# Patient Record
Sex: MALE | Race: WHITE | NOT HISPANIC OR LATINO | Employment: FULL TIME | URBAN - METROPOLITAN AREA
[De-identification: names, ages, dates, MRNs, and addresses within clinical notes are randomized per-mention and may not be internally consistent; named-entity substitution may affect disease eponyms.]

---

## 2021-08-27 ENCOUNTER — APPOINTMENT (OUTPATIENT)
Dept: CT IMAGING | Facility: HOSPITAL | Age: 71
End: 2021-08-27

## 2021-08-27 ENCOUNTER — HOSPITAL ENCOUNTER (EMERGENCY)
Facility: HOSPITAL | Age: 71
Discharge: ANOTHER HOSPITAL | End: 2021-08-27
Attending: EMERGENCY MEDICINE | Admitting: EMERGENCY MEDICINE

## 2021-08-27 VITALS
DIASTOLIC BLOOD PRESSURE: 67 MMHG | HEIGHT: 66 IN | WEIGHT: 200 LBS | SYSTOLIC BLOOD PRESSURE: 148 MMHG | BODY MASS INDEX: 32.14 KG/M2 | RESPIRATION RATE: 16 BRPM | HEART RATE: 61 BPM | TEMPERATURE: 98.9 F | OXYGEN SATURATION: 96 %

## 2021-08-27 DIAGNOSIS — Z98.890 HISTORY OF SURGICAL REMOVAL OF TESTICLE: ICD-10-CM

## 2021-08-27 DIAGNOSIS — Z86.39 HISTORY OF HYPERLIPIDEMIA: ICD-10-CM

## 2021-08-27 DIAGNOSIS — L02.214 ABSCESS OF LEFT GROIN: Primary | ICD-10-CM

## 2021-08-27 DIAGNOSIS — N28.9 ACUTE RENAL INSUFFICIENCY: ICD-10-CM

## 2021-08-27 DIAGNOSIS — C49.9 LIPOSARCOMA (HCC): ICD-10-CM

## 2021-08-27 DIAGNOSIS — Z87.438 HISTORY OF BPH: ICD-10-CM

## 2021-08-27 DIAGNOSIS — Z90.79 HISTORY OF SURGICAL REMOVAL OF TESTICLE: ICD-10-CM

## 2021-08-27 DIAGNOSIS — Z86.79 HISTORY OF HYPERTENSION: ICD-10-CM

## 2021-08-27 LAB
ALBUMIN SERPL-MCNC: 3.8 G/DL (ref 3.5–5.2)
ALBUMIN/GLOB SERPL: 1.2 G/DL
ALP SERPL-CCNC: 56 U/L (ref 39–117)
ALT SERPL W P-5'-P-CCNC: 6 U/L (ref 1–41)
ANION GAP SERPL CALCULATED.3IONS-SCNC: 14 MMOL/L (ref 5–15)
AST SERPL-CCNC: 15 U/L (ref 1–40)
BASOPHILS # BLD AUTO: 0.06 10*3/MM3 (ref 0–0.2)
BASOPHILS NFR BLD AUTO: 0.5 % (ref 0–1.5)
BILIRUB SERPL-MCNC: 0.4 MG/DL (ref 0–1.2)
BUN SERPL-MCNC: 22 MG/DL (ref 8–23)
BUN/CREAT SERPL: 17.2 (ref 7–25)
CALCIUM SPEC-SCNC: 9.6 MG/DL (ref 8.6–10.5)
CHLORIDE SERPL-SCNC: 102 MMOL/L (ref 98–107)
CO2 SERPL-SCNC: 22 MMOL/L (ref 22–29)
CREAT SERPL-MCNC: 1.28 MG/DL (ref 0.76–1.27)
D-LACTATE SERPL-SCNC: 1 MMOL/L (ref 0.5–2)
DEPRECATED RDW RBC AUTO: 48.8 FL (ref 37–54)
EOSINOPHIL # BLD AUTO: 0.16 10*3/MM3 (ref 0–0.4)
EOSINOPHIL NFR BLD AUTO: 1.3 % (ref 0.3–6.2)
ERYTHROCYTE [DISTWIDTH] IN BLOOD BY AUTOMATED COUNT: 14.4 % (ref 12.3–15.4)
GFR SERPL CREATININE-BSD FRML MDRD: 56 ML/MIN/1.73
GLOBULIN UR ELPH-MCNC: 3.2 GM/DL
GLUCOSE SERPL-MCNC: 129 MG/DL (ref 65–99)
HCT VFR BLD AUTO: 38.1 % (ref 37.5–51)
HGB BLD-MCNC: 12.1 G/DL (ref 13–17.7)
HOLD SPECIMEN: NORMAL
IMM GRANULOCYTES # BLD AUTO: 0.12 10*3/MM3 (ref 0–0.05)
IMM GRANULOCYTES NFR BLD AUTO: 1 % (ref 0–0.5)
LYMPHOCYTES # BLD AUTO: 0.95 10*3/MM3 (ref 0.7–3.1)
LYMPHOCYTES NFR BLD AUTO: 7.8 % (ref 19.6–45.3)
MCH RBC QN AUTO: 28.9 PG (ref 26.6–33)
MCHC RBC AUTO-ENTMCNC: 31.8 G/DL (ref 31.5–35.7)
MCV RBC AUTO: 91.1 FL (ref 79–97)
MONOCYTES # BLD AUTO: 1 10*3/MM3 (ref 0.1–0.9)
MONOCYTES NFR BLD AUTO: 8.2 % (ref 5–12)
NEUTROPHILS NFR BLD AUTO: 81.2 % (ref 42.7–76)
NEUTROPHILS NFR BLD AUTO: 9.84 10*3/MM3 (ref 1.7–7)
NRBC BLD AUTO-RTO: 0 /100 WBC (ref 0–0.2)
PLATELET # BLD AUTO: 264 10*3/MM3 (ref 140–450)
PMV BLD AUTO: 11.4 FL (ref 6–12)
POTASSIUM SERPL-SCNC: 4.2 MMOL/L (ref 3.5–5.2)
PROT SERPL-MCNC: 7 G/DL (ref 6–8.5)
RBC # BLD AUTO: 4.18 10*6/MM3 (ref 4.14–5.8)
SODIUM SERPL-SCNC: 138 MMOL/L (ref 136–145)
WBC # BLD AUTO: 12.13 10*3/MM3 (ref 3.4–10.8)
WHOLE BLOOD HOLD SPECIMEN: NORMAL
WHOLE BLOOD HOLD SPECIMEN: NORMAL

## 2021-08-27 PROCEDURE — 25010000002 IOPAMIDOL 61 % SOLUTION: Performed by: EMERGENCY MEDICINE

## 2021-08-27 PROCEDURE — 80053 COMPREHEN METABOLIC PANEL: CPT | Performed by: PHYSICIAN ASSISTANT

## 2021-08-27 PROCEDURE — 83605 ASSAY OF LACTIC ACID: CPT | Performed by: PHYSICIAN ASSISTANT

## 2021-08-27 PROCEDURE — 74177 CT ABD & PELVIS W/CONTRAST: CPT

## 2021-08-27 PROCEDURE — 25010000002 VANCOMYCIN 10 G RECONSTITUTED SOLUTION: Performed by: PHYSICIAN ASSISTANT

## 2021-08-27 PROCEDURE — 87147 CULTURE TYPE IMMUNOLOGIC: CPT | Performed by: PHYSICIAN ASSISTANT

## 2021-08-27 PROCEDURE — 99283 EMERGENCY DEPT VISIT LOW MDM: CPT

## 2021-08-27 PROCEDURE — 87070 CULTURE OTHR SPECIMN AEROBIC: CPT | Performed by: PHYSICIAN ASSISTANT

## 2021-08-27 PROCEDURE — 87186 SC STD MICRODIL/AGAR DIL: CPT | Performed by: PHYSICIAN ASSISTANT

## 2021-08-27 PROCEDURE — 87205 SMEAR GRAM STAIN: CPT | Performed by: PHYSICIAN ASSISTANT

## 2021-08-27 PROCEDURE — 87040 BLOOD CULTURE FOR BACTERIA: CPT | Performed by: PHYSICIAN ASSISTANT

## 2021-08-27 PROCEDURE — 85025 COMPLETE CBC W/AUTO DIFF WBC: CPT

## 2021-08-27 PROCEDURE — 96365 THER/PROPH/DIAG IV INF INIT: CPT

## 2021-08-27 RX ORDER — NEBIVOLOL 20 MG/1
20 TABLET ORAL DAILY
COMMUNITY

## 2021-08-27 RX ORDER — FENOFIBRIC ACID 135 MG/1
135 CAPSULE, DELAYED RELEASE ORAL DAILY
COMMUNITY

## 2021-08-27 RX ORDER — ASPIRIN 81 MG/1
81 TABLET, CHEWABLE ORAL DAILY
COMMUNITY

## 2021-08-27 RX ORDER — FUROSEMIDE 20 MG/1
20 TABLET ORAL DAILY
COMMUNITY

## 2021-08-27 RX ORDER — LANSOPRAZOLE 30 MG/1
30 CAPSULE, DELAYED RELEASE ORAL DAILY
COMMUNITY

## 2021-08-27 RX ORDER — SODIUM CHLORIDE 0.9 % (FLUSH) 0.9 %
10 SYRINGE (ML) INJECTION AS NEEDED
Status: DISCONTINUED | OUTPATIENT
Start: 2021-08-27 | End: 2021-08-28 | Stop reason: HOSPADM

## 2021-08-27 RX ORDER — TAMSULOSIN HYDROCHLORIDE 0.4 MG/1
1 CAPSULE ORAL DAILY
COMMUNITY

## 2021-08-27 RX ORDER — NISOLDIPINE 34 MG/1
34 TABLET, FILM COATED, EXTENDED RELEASE ORAL DAILY
COMMUNITY

## 2021-08-27 RX ORDER — ICOSAPENT ETHYL 1000 MG/1
2 CAPSULE ORAL 2 TIMES DAILY WITH MEALS
COMMUNITY

## 2021-08-27 RX ORDER — FINASTERIDE 5 MG/1
5 TABLET, FILM COATED ORAL DAILY
COMMUNITY

## 2021-08-27 RX ORDER — METHOCARBAMOL 750 MG/1
750 TABLET, FILM COATED ORAL 3 TIMES DAILY
COMMUNITY

## 2021-08-27 RX ADMIN — VANCOMYCIN HYDROCHLORIDE 1750 MG: 100 INJECTION, POWDER, LYOPHILIZED, FOR SOLUTION INTRAVENOUS at 23:21

## 2021-08-27 RX ADMIN — IOPAMIDOL 100 ML: 612 INJECTION, SOLUTION INTRAVENOUS at 20:15

## 2021-08-27 NOTE — ED PROVIDER NOTES
Subjective   This is a 71-year-old male that presents to the ER with probable abscess to the left groin at incision site from recent left spermatic cord removal secondary to liposarcoma of the left groin.  Patient is followed at  and sees urology, Dr. Miguel Angel Villanueva.  Patient says that he initially had left testicle removed due to cancer and most recently had left spermatic cord removed on 8/2/2021.  Patient has done fairly well following surgery.  Proximately 3 days ago he noticed some redness, induration, and drainage coming from the incision site.  He was utilizing warm compresses.  He denies any systemic symptoms of fever, chills, generalized body aches, or nausea/vomiting.  He denies personal history of diabetes.  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  Patient also has history of hypertension, hyperlipidemia, GERD, and history of basal cell carcinoma.  Patient says that he was able to express a large amount of purulent drainage from the incision site earlier this afternoon.  He tried to be evaluated by Dr. Villanueva and he cannot get into his office.  He also initially try to get a , which is where all of his surgeries were performed, but they said that his wife had to stay out in the car, and she has problems with her retina, and patient became upset and decided to come to our emergency room instead.      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 Pt had left testicle removed, as well as left spermatic cord removed recently on 8/2/21 at .  Awaiting  radiation mapping on 9/9/21 and will start radiation/chemo soon.    Relieved by:  Nothing  Worsened by:  Nothing  Ineffective treatments:  Warm compresses and draining/squeezing  Associated symptoms: no anorexia, no fatigue, no fever, no nausea and no vomiting    Risk factors: no hx of MRSA        Review of Systems   Constitutional: Negative.  Negative for activity change, appetite change, chills, diaphoresis, fatigue and fever.   Respiratory: Negative.  Negative for cough and shortness of breath.    Cardiovascular: Negative.  Negative for chest pain, palpitations and leg swelling.   Gastrointestinal: Negative.  Negative for abdominal pain, anorexia, constipation, diarrhea, nausea and vomiting.   Genitourinary: Negative for flank pain and frequency.        History of liposarcoma to left groin with radiation to left testicle.  S/P left testicle and spermatic cord removal.  Recent development of abscess.   Musculoskeletal: Negative.  Negative for back pain.   Skin: Positive for color change (erythema to suprapubic region.) and wound (abscess near recent incision from left testicle removal in 6/2021.).        Induration to suprapubic region.     Neurological: Negative.    All other systems reviewed and are negative.      Past Medical History:   Diagnosis Date   • Basal cell carcinoma    • Chronic sinusitis    • GERD (gastroesophageal reflux disease)    • Hyperlipidemia    • Hypertension        Allergies   Allergen Reactions   • Latex Rash       Past Surgical History:   Procedure Laterality Date   • APPENDECTOMY     • HERNIA REPAIR     • JOINT REPLACEMENT     • LYMPH NODE DISSECTION     • SINUS SURGERY     • TESTICLE SURGERY         History reviewed. No pertinent family history.    Social History     Socioeconomic History   • Marital status:      Spouse name: Not on file   • Number of children: Not on file   • Years of education: Not on file   • Highest education level: Not on file   Tobacco Use   • Smoking  status: Never Smoker   Substance and Sexual Activity   • Alcohol use: Yes     Comment: occasionally   • Drug use: Never   • Sexual activity: Defer           Objective   Physical Exam  Vitals and nursing note reviewed.   Constitutional:       Appearance: Normal appearance.   HENT:      Head: Normocephalic and atraumatic.      Right Ear: Tympanic membrane normal.      Left Ear: Tympanic membrane normal.      Nose: Nose normal.      Mouth/Throat:      Mouth: Mucous membranes are moist.      Pharynx: Oropharynx is clear.   Eyes:      Extraocular Movements: Extraocular movements intact.      Conjunctiva/sclera: Conjunctivae normal.      Pupils: Pupils are equal, round, and reactive to light.   Cardiovascular:      Rate and Rhythm: Normal rate and regular rhythm.  No extrasystoles are present.     Pulses: Normal pulses.      Heart sounds: Normal heart sounds.      Comments: Regular rate and rhythm.  No tachycardia.  No pedal edema to lower extremities.  Pulmonary:      Effort: Pulmonary effort is normal. No tachypnea or accessory muscle usage.      Breath sounds: Normal breath sounds. No decreased air movement.      Comments: Lungs are clear to auscultation bilaterally.  Abdominal:      General: Bowel sounds are normal. There is distension.      Palpations: Abdomen is soft.      Tenderness: There is no abdominal tenderness. There is no right CVA tenderness, left CVA tenderness, guarding or rebound.      Comments: Mild distention.  Soft with active bowel sounds.  Nontender to palpation.   Genitourinary:         Comments: There is incision to the left inguinal region status post previous left testicle removal in 6/2021.  There is copious purulent drainage expelled from the incision site.  Aerobic wound cultures were obtained.  There is confluent erythema and induration that extends to the mid suprapubic region.  Status post left testicle removal.  Musculoskeletal:         General: Normal range of motion.      Cervical back:  Normal range of motion and neck supple.      Right lower leg: No edema.      Left lower leg: No edema.   Skin:     General: Skin is warm and dry.      Findings: Erythema present.      Comments: See  exam for details.   Neurological:      General: No focal deficit present.      Mental Status: He is alert.         Procedures           ED Course  ED Course as of Aug 27 2229   Fri Aug 27, 2021   2105 CBC reveals mild leukocytosis with white blood cell count of 12.13.  Differential shows 81% neutrophils.  Chemistries reveal creatinine of 1.28, but otherwise chemistries were normal.  No previous chemistries for comparison.  Aerobic wound culture was obtained from drainage from the left groin, incision site.  CT of the abdomen/pelvis with contrast reveals postsurgical changes in the left inguinal region from recent reported spermatic cord removal.  There is inflammatory stranding within the soft tissues and edema extending into the left hemiscrotum.  There is an ill-defined slightly rim-enhancing collection in the proximal portion of the left hemiscrotum in the region of the inguinal canal measuring 3.3 x 2.3 cm.  This may represent an expected postsurgical fluid collection, but abscess is not excluded.  There is also an ill-defined fluid density collection within the pelvis just anterior to the left iliac vessels and left iliopsoas muscle, measuring 3 x 1.6 cm.  This may also represent an expected postsurgical fluid collection but abscess is not excluded.  No other abdominal or pelvic findings.  Prostate gland enlargement.    [FC]   2109 Paged [facility] for consult since patient has had all of his recent surgeries at their facility and is followed by Dr. Miguel Angel Villanueva, Urology.      [FC]   2131 Discussed the case with Dr. Villanueva's associate.  They said that the incision that is draining is the incision from June, 2021 where patient had left testicle removed.  Patient had recent laparoscopic incisions for the left spermatic cord  removal on 8/2/2021.  They recommended to initiate vancomycin and transfer him back to  where he had all of his previous surgeries performed.  Dr. Metzger, transfer physician, expected patient.  I will go update the patient and prepare him for transfer.    [FC]   2223 I have gone into the patient's room multiple times because his wife cannot see at night due to central visual loss.  Patient's wife is very aggravated and upset and arguing with the patient abou how he will get to  and how she will get to .  They have finally contacted their daughter who is coming to get his wife.  Advised that admission is necessary for IV antibiotics and for patient's urologist to take a look at the abscess in his groin with recent multiple surgeries and history of liposarcoma.  Both wife and patient have finally come down and are agreeable for transfer.    [FC]      ED Course User Index  [FC] Yesica Leon PA-C           Recent Results (from the past 24 hour(s))   Green Top (Gel)    Collection Time: 08/27/21  4:06 PM   Result Value Ref Range    Extra Tube Hold for add-ons.    Lavender Top    Collection Time: 08/27/21  4:06 PM   Result Value Ref Range    Extra Tube hold for add-on    Gold Top - SST    Collection Time: 08/27/21  4:06 PM   Result Value Ref Range    Extra Tube Hold for add-ons.    Gray Top    Collection Time: 08/27/21  4:06 PM   Result Value Ref Range    Extra Tube Hold for add-ons.    Light Blue Top    Collection Time: 08/27/21  4:06 PM   Result Value Ref Range    Extra Tube hold for add-on    CBC Auto Differential    Collection Time: 08/27/21  4:06 PM    Specimen: Blood   Result Value Ref Range    WBC 12.13 (H) 3.40 - 10.80 10*3/mm3    RBC 4.18 4.14 - 5.80 10*6/mm3    Hemoglobin 12.1 (L) 13.0 - 17.7 g/dL    Hematocrit 38.1 37.5 - 51.0 %    MCV 91.1 79.0 - 97.0 fL    MCH 28.9 26.6 - 33.0 pg    MCHC 31.8 31.5 - 35.7 g/dL    RDW 14.4 12.3 - 15.4 %    RDW-SD 48.8 37.0 - 54.0 fl    MPV 11.4 6.0 - 12.0 fL    Platelets  264 140 - 450 10*3/mm3    Neutrophil % 81.2 (H) 42.7 - 76.0 %    Lymphocyte % 7.8 (L) 19.6 - 45.3 %    Monocyte % 8.2 5.0 - 12.0 %    Eosinophil % 1.3 0.3 - 6.2 %    Basophil % 0.5 0.0 - 1.5 %    Immature Grans % 1.0 (H) 0.0 - 0.5 %    Neutrophils, Absolute 9.84 (H) 1.70 - 7.00 10*3/mm3    Lymphocytes, Absolute 0.95 0.70 - 3.10 10*3/mm3    Monocytes, Absolute 1.00 (H) 0.10 - 0.90 10*3/mm3    Eosinophils, Absolute 0.16 0.00 - 0.40 10*3/mm3    Basophils, Absolute 0.06 0.00 - 0.20 10*3/mm3    Immature Grans, Absolute 0.12 (H) 0.00 - 0.05 10*3/mm3    nRBC 0.0 0.0 - 0.2 /100 WBC   Comprehensive Metabolic Panel    Collection Time: 08/27/21  4:06 PM    Specimen: Blood   Result Value Ref Range    Glucose 129 (H) 65 - 99 mg/dL    BUN 22 8 - 23 mg/dL    Creatinine 1.28 (H) 0.76 - 1.27 mg/dL    Sodium 138 136 - 145 mmol/L    Potassium 4.2 3.5 - 5.2 mmol/L    Chloride 102 98 - 107 mmol/L    CO2 22.0 22.0 - 29.0 mmol/L    Calcium 9.6 8.6 - 10.5 mg/dL    Total Protein 7.0 6.0 - 8.5 g/dL    Albumin 3.80 3.50 - 5.20 g/dL    ALT (SGPT) 6 1 - 41 U/L    AST (SGOT) 15 1 - 40 U/L    Alkaline Phosphatase 56 39 - 117 U/L    Total Bilirubin 0.4 0.0 - 1.2 mg/dL    eGFR Non African Amer 56 (L) >60 mL/min/1.73    Globulin 3.2 gm/dL    A/G Ratio 1.2 g/dL    BUN/Creatinine Ratio 17.2 7.0 - 25.0    Anion Gap 14.0 5.0 - 15.0 mmol/L   Lactic Acid, Plasma    Collection Time: 08/27/21  4:06 PM    Specimen: Blood   Result Value Ref Range    Lactate 1.0 0.5 - 2.0 mmol/L   Wound Culture - Drainage, Groin    Collection Time: 08/27/21  7:41 PM    Specimen: Groin; Drainage   Result Value Ref Range    Gram Stain Moderate (3+) WBCs per low power field     Gram Stain No organisms seen      Note: In addition to lab results from this visit, the labs listed above may include labs taken at another facility or during a different encounter within the last 24 hours. Please correlate lab times with ED admission and discharge times for further clarification of the  "services performed during this visit.    CT Abdomen Pelvis With Contrast   Final Result      1. Postsurgical changes in the left inguinal region from recent reported spermatic cord removal. There is inflammatory stranding within the soft tissues and edema extending into the left hemiscrotum. There is an ill-defined, slightly rim-enhancing   collection in the proximal portion of the left hemiscrotum in the region of the inguinal canal, measuring 3.3 x 2.3 cm. This may represent an expected postsurgical fluid collection, but abscess is not excluded. There is also an ill-defined fluid density   collection within the pelvis just anterior to the left iliac vessels and left iliopsoas muscle, measuring 3 x 1.6 cm. This may also represent an expected postsurgical fluid collection, but abscess is not excluded.   2. No other acute abdominal or pelvic findings.   3. Prostate gland enlargement.               Signer Name: Tariq Ureña MD    Signed: 8/27/2021 8:45 PM    Workstation Name: [email or web address]     [facility]        Vitals:    08/27/21 1554 08/27/21 1947 08/27/21 2102   BP: 152/62 152/89 155/67   BP Location: Left arm     Patient Position: Sitting     Pulse: 66 72 70   Resp: 16     Temp: 98.7 °F (37.1 °C)     TempSrc: Oral     SpO2: 97% 96% 96%   Weight: 90.7 kg (200 lb)     Height: 167.6 cm (66\")       Medications   sodium chloride 0.9 % flush 10 mL (has no administration in time range)   sodium chloride 0.9 % flush 10 mL (has no administration in time range)   vancomycin 1750 mg/500 mL 0.9% NS IVPB ([facility]) (has no administration in time range)   iopamidol (ISOVUE-300) 61 % injection 100 mL (100 mL Intravenous Given 8/27/21 2015)     ECG/EMG Results (last 24 hours)     ** No results found for the last 24 hours. **        No orders to display                                       MDM    Final diagnoses:   Abscess of left groin   History of surgical removal of testicle   Liposarcoma (CMS/HCC)   Acute " renal insufficiency   History of hypertension   History of hyperlipidemia   History of BPH       ED Disposition  ED Disposition     ED Disposition Condition Comment    Transfer to Another Facility   [facility]; Dr. Metzger          No follow-up provider specified.       Medication List      No changes were made to your prescriptions during this visit.          Yesica Leon PA-C  08/27/21 5121

## 2021-08-30 ENCOUNTER — TELEPHONE (OUTPATIENT)
Dept: EMERGENCY DEPT | Facility: HOSPITAL | Age: 71
End: 2021-08-30

## 2021-08-30 LAB
BACTERIA SPEC AEROBE CULT: ABNORMAL
BACTERIA SPEC AEROBE CULT: ABNORMAL
GRAM STN SPEC: ABNORMAL
GRAM STN SPEC: ABNORMAL

## 2021-08-30 NOTE — TELEPHONE ENCOUNTER
Telephone call to [facility] to advise of positive MRSA wound culture results.  According to [facility], patient had already been discharged.  I then called patient's telephone number of record and discussed results with him.  He was discharged with sulfamethoxazole/trimethoprim twice daily.  He states the wound has stopped draining.  I advised him to contact Dr. Villanueva's office to advise them of the results.  I encouraged him to call with any questions or return if we can be of any assistance.  He verbalized understanding and is agreeable to the plan.   PGY3/Event Note

## 2021-09-01 LAB
BACTERIA SPEC AEROBE CULT: NORMAL
BACTERIA SPEC AEROBE CULT: NORMAL